# Patient Record
Sex: FEMALE | Race: WHITE | Employment: FULL TIME
[De-identification: names, ages, dates, MRNs, and addresses within clinical notes are randomized per-mention and may not be internally consistent; named-entity substitution may affect disease eponyms.]

---

## 2017-07-15 ENCOUNTER — HEALTH MAINTENANCE LETTER (OUTPATIENT)
Age: 37
End: 2017-07-15

## 2019-11-03 ENCOUNTER — HEALTH MAINTENANCE LETTER (OUTPATIENT)
Age: 39
End: 2019-11-03

## 2020-09-29 ENCOUNTER — OFFICE VISIT (OUTPATIENT)
Dept: FAMILY MEDICINE | Facility: CLINIC | Age: 40
End: 2020-09-29
Payer: COMMERCIAL

## 2020-09-29 VITALS
BODY MASS INDEX: 24.41 KG/M2 | OXYGEN SATURATION: 99 % | WEIGHT: 137.8 LBS | RESPIRATION RATE: 16 BRPM | TEMPERATURE: 98.1 F | HEART RATE: 83 BPM | DIASTOLIC BLOOD PRESSURE: 74 MMHG | HEIGHT: 63 IN | SYSTOLIC BLOOD PRESSURE: 126 MMHG

## 2020-09-29 DIAGNOSIS — R14.2 FLATULENCE, ERUCTATION AND GAS PAIN: ICD-10-CM

## 2020-09-29 DIAGNOSIS — N64.4 BREAST PAIN: ICD-10-CM

## 2020-09-29 DIAGNOSIS — R14.3 FLATULENCE, ERUCTATION AND GAS PAIN: ICD-10-CM

## 2020-09-29 DIAGNOSIS — K21.9 GASTROESOPHAGEAL REFLUX DISEASE WITHOUT ESOPHAGITIS: ICD-10-CM

## 2020-09-29 DIAGNOSIS — N92.0 MENORRHAGIA WITH REGULAR CYCLE: Primary | ICD-10-CM

## 2020-09-29 DIAGNOSIS — R14.1 FLATULENCE, ERUCTATION AND GAS PAIN: ICD-10-CM

## 2020-09-29 DIAGNOSIS — R53.83 FATIGUE, UNSPECIFIED TYPE: ICD-10-CM

## 2020-09-29 LAB
ALBUMIN SERPL-MCNC: 3.7 G/DL (ref 3.4–5)
ALP SERPL-CCNC: 59 U/L (ref 40–150)
ALT SERPL W P-5'-P-CCNC: 20 U/L (ref 0–50)
ANION GAP SERPL CALCULATED.3IONS-SCNC: 4 MMOL/L (ref 3–14)
AST SERPL W P-5'-P-CCNC: 21 U/L (ref 0–45)
BASOPHILS # BLD AUTO: 0 10E9/L (ref 0–0.2)
BASOPHILS NFR BLD AUTO: 0.4 %
BILIRUB SERPL-MCNC: 0.6 MG/DL (ref 0.2–1.3)
BUN SERPL-MCNC: 16 MG/DL (ref 7–30)
CALCIUM SERPL-MCNC: 9.3 MG/DL (ref 8.5–10.1)
CHLORIDE SERPL-SCNC: 107 MMOL/L (ref 94–109)
CO2 SERPL-SCNC: 27 MMOL/L (ref 20–32)
CREAT SERPL-MCNC: 0.82 MG/DL (ref 0.52–1.04)
DIFFERENTIAL METHOD BLD: NORMAL
EOSINOPHIL # BLD AUTO: 0.1 10E9/L (ref 0–0.7)
EOSINOPHIL NFR BLD AUTO: 1.4 %
ERYTHROCYTE [DISTWIDTH] IN BLOOD BY AUTOMATED COUNT: 12.6 % (ref 10–15)
GFR SERPL CREATININE-BSD FRML MDRD: 90 ML/MIN/{1.73_M2}
GLUCOSE SERPL-MCNC: 101 MG/DL (ref 70–99)
HCT VFR BLD AUTO: 43.4 % (ref 35–47)
HGB BLD-MCNC: 14.4 G/DL (ref 11.7–15.7)
LYMPHOCYTES # BLD AUTO: 3 10E9/L (ref 0.8–5.3)
LYMPHOCYTES NFR BLD AUTO: 29.8 %
MCH RBC QN AUTO: 31 PG (ref 26.5–33)
MCHC RBC AUTO-ENTMCNC: 33.2 G/DL (ref 31.5–36.5)
MCV RBC AUTO: 94 FL (ref 78–100)
MONOCYTES # BLD AUTO: 0.6 10E9/L (ref 0–1.3)
MONOCYTES NFR BLD AUTO: 5.9 %
NEUTROPHILS # BLD AUTO: 6.2 10E9/L (ref 1.6–8.3)
NEUTROPHILS NFR BLD AUTO: 62.5 %
PLATELET # BLD AUTO: 225 10E9/L (ref 150–450)
POTASSIUM SERPL-SCNC: 4 MMOL/L (ref 3.4–5.3)
PROT SERPL-MCNC: 7.4 G/DL (ref 6.8–8.8)
RBC # BLD AUTO: 4.64 10E12/L (ref 3.8–5.2)
SODIUM SERPL-SCNC: 138 MMOL/L (ref 133–144)
TSH SERPL DL<=0.005 MIU/L-ACNC: 1.54 MU/L (ref 0.4–4)
WBC # BLD AUTO: 9.9 10E9/L (ref 4–11)

## 2020-09-29 PROCEDURE — 80053 COMPREHEN METABOLIC PANEL: CPT | Performed by: FAMILY MEDICINE

## 2020-09-29 PROCEDURE — 84443 ASSAY THYROID STIM HORMONE: CPT | Performed by: FAMILY MEDICINE

## 2020-09-29 PROCEDURE — 99214 OFFICE O/P EST MOD 30 MIN: CPT | Performed by: FAMILY MEDICINE

## 2020-09-29 PROCEDURE — 36415 COLL VENOUS BLD VENIPUNCTURE: CPT | Performed by: FAMILY MEDICINE

## 2020-09-29 PROCEDURE — 80050 GENERAL HEALTH PANEL: CPT | Performed by: FAMILY MEDICINE

## 2020-09-29 ASSESSMENT — ANXIETY QUESTIONNAIRES
6. BECOMING EASILY ANNOYED OR IRRITABLE: NOT AT ALL
GAD7 TOTAL SCORE: 0
5. BEING SO RESTLESS THAT IT IS HARD TO SIT STILL: NOT AT ALL
3. WORRYING TOO MUCH ABOUT DIFFERENT THINGS: NOT AT ALL
IF YOU CHECKED OFF ANY PROBLEMS ON THIS QUESTIONNAIRE, HOW DIFFICULT HAVE THESE PROBLEMS MADE IT FOR YOU TO DO YOUR WORK, TAKE CARE OF THINGS AT HOME, OR GET ALONG WITH OTHER PEOPLE: NOT DIFFICULT AT ALL
7. FEELING AFRAID AS IF SOMETHING AWFUL MIGHT HAPPEN: NOT AT ALL
2. NOT BEING ABLE TO STOP OR CONTROL WORRYING: NOT AT ALL
1. FEELING NERVOUS, ANXIOUS, OR ON EDGE: NOT AT ALL

## 2020-09-29 ASSESSMENT — PATIENT HEALTH QUESTIONNAIRE - PHQ9
SUM OF ALL RESPONSES TO PHQ QUESTIONS 1-9: 0
5. POOR APPETITE OR OVEREATING: NOT AT ALL

## 2020-09-29 ASSESSMENT — MIFFLIN-ST. JEOR: SCORE: 1260.31

## 2020-09-29 NOTE — PROGRESS NOTES
Subjective     Ingrid Flores is a 40 year old female who presents to clinic today for the following health issues:    HPI       Menstrual Concern  Onset/Duration: 3 months  Description:   Duration of bleeding episodes: 8 days  Frequency of periods: (1st day of one to 1st day of next):  every 3.5 weeks  Describe bleeding/flow:   Clots: YES  Number of pads/day: 1 (tampon) per hour        Cramping: severe during and mild before start of menses  Glands are sore.  Tenderness in breasts.   More heartburn and more gas than usual.   Accompanying Signs & Symptoms:  Lightheadedness: no  Temperature intolerance: no  Nosebleeds/Easy bruising: no nosebleeds, has been bruising easier  Vaginal Discharge: no  Acne: YES  Change in body hair: no  History:  Patient's last menstrual period was 09/02/2020 (exact date).  Previous normal periods: YES  Contraceptive use: Natural Family Planning   Sexually active: YES  Any bleeding after intercourse: no  Abnormal PAP Smears: YES 10+ years ago repeat pap was normal   Precipitating or alleviating factors: used to exercise more  Therapies tried and outcome: None    Had heavy periods in past, they got better with time. But last 3 months has had very heavy periods, using tampon and pads changing every hour.   Breast pain and pelvic cramping even when doesn't have a period.   Both breasts painful, hard to run, has been running 3-5 miles a day.     Had had a little anxiety a few months ago, heart will beat fast. No chest pain or shortness of breath. Nothing lately.   Has been feeling very tired. Sleeping well at night, eats healthy. Runs most every day.     Has been gassy. Doesn't usually have that. Has had heartburn. Lots of acid in her throat. No black or bloody stools.       Review of Systems   ROS: 5 point ROS negative except as noted above in HPI, including Gen., Resp., CV, GI &  system review.       Objective    /74 (BP Location: Right arm, Patient Position: Sitting, Cuff Size:  "Adult Regular)   Pulse 83   Temp 98.1  F (36.7  C) (Tympanic)   Resp 16   Ht 1.594 m (5' 2.76\")   Wt 62.5 kg (137 lb 12.8 oz)   LMP 09/02/2020 (Exact Date)   SpO2 99%   BMI 24.60 kg/m    Body mass index is 24.6 kg/m .  Physical Exam   GENERAL: healthy, alert and no distress  EYES: Eyes grossly normal to inspection, PERRL and conjunctivae and sclerae normal  HENT: ear canals and TM's normal, nose and mouth without ulcers or lesions  NECK: no adenopathy, no asymmetry, masses, or scars and thyroid normal to palpation  RESP: lungs clear to auscultation - no rales, rhonchi or wheezes  BREAST: normal without masses, bilateral implants, there is bilateral tenderness, no nipple discharge and no palpable axillary masses or adenopathy  CV: regular rate and rhythm, normal S1 S2, no S3 or S4, no murmur, click or rub, no peripheral edema and peripheral pulses strong  ABDOMEN: soft, mild tenderness in pelvis bilaterally, no hepatosplenomegaly, no masses and bowel sounds normal  MS: no gross musculoskeletal defects noted, no edema          Assessment & Plan     Menorrhagia with regular cycle  - TSH with free T4 reflex  - CBC with platelets and differential  - US Pelvic Complete with Transvaginal; Future  Consider ob/gyn referral    Fatigue, unspecified type    - TSH with free T4 reflex  - CBC with platelets and differential  - Comprehensive metabolic panel    Gastroesophageal reflux disease without esophagitis    - Comprehensive metabolic panel  - omeprazole (PRILOSEC) 20 MG DR capsule; Take 1 capsule (20 mg) by mouth daily    Flatulence, eructation and gas pain  Will treat gastroesophageal reflux disease   Pelvic ultrasound   - Comprehensive metabolic panel    Breast pain  Bilateral  Likely hormonal, but should get mammogram and ultrasound   - MA Diagnostic Digital Bilateral; Future  - US Breast Bilateral Complete 4 Quadrants; Future     Tobacco Cessation:   reports that she has been smoking cigarettes. She has a 5.00 " pack-year smoking history. She has never used smokeless tobacco.  Tobacco Cessation Action Plan: Information offered: Patient not interested at this time    Patient Instructions   Labs today    Pelvic ultrasound     Breast mammogram and ultrasound     Start omeprazole 20 mg once a day  In two weeks mychart me how heart burn is       Return in about 1 month (around 10/29/2020).    Silvana Apple MD  [facility]

## 2020-09-29 NOTE — PATIENT INSTRUCTIONS
Labs today    Pelvic ultrasound     Breast mammogram and ultrasound     Start omeprazole 20 mg once a day  In two weeks mychart me how heart burn is

## 2020-09-30 ASSESSMENT — ANXIETY QUESTIONNAIRES: GAD7 TOTAL SCORE: 0

## 2020-11-16 ENCOUNTER — HEALTH MAINTENANCE LETTER (OUTPATIENT)
Age: 40
End: 2020-11-16

## 2021-05-20 ENCOUNTER — TELEPHONE (OUTPATIENT)
Dept: FAMILY MEDICINE | Facility: CLINIC | Age: 41
End: 2021-05-20

## 2021-05-20 NOTE — TELEPHONE ENCOUNTER
Patient Quality Outreach      Summary:    Patient has the following on her problem list/HM:   Depression / Dysthymia review    6 Month Remission: 4-8 month window range:   12 Month Remission: 10-14 month window range:        PHQ-9 SCORE 12/19/2011 9/11/2012 9/29/2020   PHQ-9 Total Score 0 0 -   PHQ-9 Total Score - - 0       If PHQ-9 recheck is 5 or more, route to provider for next steps.    Patient is due/failing the following:   Cervical Cancer Screening - PAP Needed and PHQ-9 Needed    Type of outreach:    Sent [employer] message.    Questions for provider review:    None                                                                                                                                     Barbara Aguilar MA

## 2021-08-21 ENCOUNTER — NURSE TRIAGE (OUTPATIENT)
Dept: NURSING | Facility: CLINIC | Age: 41
End: 2021-08-21

## 2021-08-21 NOTE — TELEPHONE ENCOUNTER
They are remodeling their house, and over the last few weeks she has been itching and an occasional rash that starts out as a small rash/ pimple and turns into a widespread itching that everyone in the house gets.  It is a rock hard bump under her skin at times.  Sometimes extremely itchy.  Care advice is to be seen within 3 days.  Patient denies need to make an appointment at this time.    Margie Steen RN/ [facility]        Reason for Disposition    [1] Widespread itching AND [2] cause unknown AND [3] present > 48 hours (Exception: caller knows the cause and can eliminate it)    Additional Information    Negative: [1] Life-threatening reaction (anaphylaxis) in the past to similar substance (e.g., food, insect bite/sting, chemical, etc.) AND [2] < 2 hours since exposure    Negative: Difficulty breathing or wheezing    Negative: [1] Difficulty swallowing or slurred speech AND [2] sudden onset    Negative: Sounds like a life-threatening emergency to the triager    Negative: Patient sounds very sick or weak to the triager    Negative: [1] MODERATE-SEVERE widespread itching (i.e., interferes with sleep, normal activities or school) AND [2] not improved after 24 hours of itching Care Advice    Negative: [1] MODERATE-SEVERE widespread itching (i.e., interferes with sleep, normal activities or school) AND [2] pregnant    Negative: Taking prescription medication that could cause itching (e.g., codeine/morphine/other opiates, aspirin)    Negative: [1] Hand or foot  itching AND [2] pregnant    Protocols used: ITCHING - WIDESPREAD-A-

## 2021-09-18 ENCOUNTER — HEALTH MAINTENANCE LETTER (OUTPATIENT)
Age: 41
End: 2021-09-18

## 2022-01-08 ENCOUNTER — HEALTH MAINTENANCE LETTER (OUTPATIENT)
Age: 42
End: 2022-01-08

## 2022-11-19 ENCOUNTER — HEALTH MAINTENANCE LETTER (OUTPATIENT)
Age: 42
End: 2022-11-19

## 2023-04-09 ENCOUNTER — HEALTH MAINTENANCE LETTER (OUTPATIENT)
Age: 43
End: 2023-04-09

## 2024-04-07 ENCOUNTER — HEALTH MAINTENANCE LETTER (OUTPATIENT)
Age: 44
End: 2024-04-07

## 2024-06-16 ENCOUNTER — HEALTH MAINTENANCE LETTER (OUTPATIENT)
Age: 44
End: 2024-06-16